# Patient Record
Sex: FEMALE
[De-identification: names, ages, dates, MRNs, and addresses within clinical notes are randomized per-mention and may not be internally consistent; named-entity substitution may affect disease eponyms.]

---

## 2023-10-09 ENCOUNTER — HOSPITAL ENCOUNTER (EMERGENCY)
Dept: HOSPITAL 97 - ER | Age: 18
Discharge: HOME | End: 2023-10-09
Payer: SELF-PAY

## 2023-10-09 VITALS — OXYGEN SATURATION: 100 % | SYSTOLIC BLOOD PRESSURE: 137 MMHG | DIASTOLIC BLOOD PRESSURE: 95 MMHG | TEMPERATURE: 98.1 F

## 2023-10-09 DIAGNOSIS — G51.0: Primary | ICD-10-CM

## 2023-10-09 PROCEDURE — 99283 EMERGENCY DEPT VISIT LOW MDM: CPT

## 2023-10-09 PROCEDURE — 70450 CT HEAD/BRAIN W/O DYE: CPT

## 2023-10-09 NOTE — RAD REPORT
EXAM DESCRIPTION:  CT - Head Brain Wo Cont - 10/9/2023 8:58 pm

 

CLINICAL HISTORY:  BELL'S PALSY

 

COMPARISON:  No comparisons

 

TECHNIQUE:  Noncontrast head CT images were obtained without IV contrast. Multiplanar reformats were 
generated and reviewed.

 

All CT scans are performed using dose optimization technique as appropriate and may include automated
 exposure control or mA/KV adjustment according to patient size.

 

FINDINGS:  No intracranial hemorrhage, mass, or edema. Midline structures are unremarkable.

 

Normal ventricular caliber for age.

 

Gray-white matter differentiation is preserved, without evidence of acute infarct. No abnormal extra-
axial fluid collections.

 

Mastoid air cells are well aerated. Mucous retention cyst in the left maxillary sinus.

 

No acute bony findings.

 

 

IMPRESSION:  No evidence of an acute intracranial process.

## 2023-10-09 NOTE — ER
Nurse's Notes                                                                                     

 [facility]                                                                 

Name: Katia Brynat                                                                               

Age: 18 yrs                                                                                       

Sex: Female                                                                                       

: 2005                                                                                   

MRN: [id number]                                                                                   

Arrival Date: 10/09/2023                                                                          

Time: 20:16                                                                                       

Account#: [id number]                                                                            

Bed IW5                                                                                           

Private MD:                                                                                       

Diagnosis: Bell's palsy                                                                           

                                                                                                  

Presentation:                                                                                     

10/09                                                                                             

20:24 Chief complaint: Patient states: "My tongue has felt funny when I woke up this morning, mb9 

      feels burnt. I have a bad headache and my right side of mouth is drooping. My mother in     

      law noticed my face around 1830 today." Last known normal was 2am. Coronavirus screen:      

      Vaccine status: Patient reports receiving the 2nd dose of the covid vaccine. Ebola          

      Screen: No symptoms or risks identified at this time. Initial Sepsis Screen: Does the       

      patient meet any 2 criteria? No. Patient's initial sepsis screen is negative. Does the      

      patient have a suspected source of infection? No. Patient's initial sepsis screen is        

      negative. Risk Assessment: Do you want to hurt yourself or someone else? Patient            

      reports no desire to harm self or others. Onset of symptoms was 2023.           

20:24 Method Of Arrival: Ambulatory                                                           mb9 

20:28 Acuity: HAI 3                                                                           mb9 

                                                                                                  

Triage Assessment:                                                                                

20:28 The onset of the patients symptoms was. General: Appears in no apparent distress.       mb9 

      Behavior is calm, cooperative. Pain: Complains of pain in head Pain does not radiate.       

      Quality of pain is described as pressure, Is continuous. Neuro: Hernandez                    

      Agitation-Sedation Scale (RASS): 0 - Alert and Calm Level of Consciousness is awake,        

      alert, obeys commands, Oriented to person, place, time, situation, Appropriate for age      

       are equal bilaterally Moves all extremities. Gait is steady, Speech is normal,        

      Facial droop on left, Pupils are PERRLA, Intact Reports headache. Derm: Skin is pink,       

      warm \T\ dry.                                                                               

                                                                                                  

Historical:                                                                                       

- Allergies:                                                                                      

20:28 No Known Allergies;                                                                     mb9 

- Home Meds:                                                                                      

20:28 None [Active];                                                                          mb9 

- PMHx:                                                                                           

20:28 None;                                                                                   mb9 

- PSHx:                                                                                           

20:28 None;                                                                                   mb9 

                                                                                                  

- Immunization history:: Adult Immunizations up to date.                                          

- Social history:: Smoking status: Patient denies any tobacco usage or history of.                

- Family history:: not pertinent.                                                                 

                                                                                                  

                                                                                                  

Screenin:31 [facility] ED Fall Risk Assessment (Adult) History of falling in the last 3 months,       mb9 

      including since admission No falls in past 3 months (0 pts) Confusion or Disorientation     

      No (0 pts) Intoxicated or Sedated No (0 pts) Impaired Gait No (0 pts) Mobility Assist       

      Device Used No (0 pt) Altered Elimination No (0 pt) Score/Fall Risk Level 0 - 2 = Low       

      Risk Oriented to surroundings, Maintained a safe environment, Educated pt \T\ family on     

      fall prevention, incl call for assistance when getting out of bed. Abuse screen: Denies     

      threats or abuse. Nutritional screening: No deficits noted. Tuberculosis screening: No      

      symptoms or risk factors identified.                                                        

                                                                                                  

Assessment:                                                                                       

20:31 Reassessment: see triage assessment.                                                    mb9 

                                                                                                  

Vital Signs:                                                                                      

20:24  / 95; Pulse 87; Resp 18; Temp 98.1; Pulse Ox 100% ; Weight 136.08 kg; Height 5   mb9 

      ft. 4 in. ;                                                                                 

20:24 Body Mass Index 51.49 (136.08 kg, 162.56 cm)                                            mb9 

                                                                                                  

NIH Stroke Scale Scores:                                                                          

20:35 NIHSS Score: 1                                                                          [facility] 

                                                                                                  

ED Course:                                                                                        

20:20 Patient arrived in ED.                                                                  ag3 

20:24 Arm band placed on.                                                                     mb9 

20:27 Alan Chen MD is Attending Physician.                                             [facility] 

20:28 Triage completed.                                                                       mb9 

20:31 Bed in low position. Call light in reach. Side rails up X 1. Client placed on           mb9 

      continuous cardiac and pulse oximetry monitoring. NIBP monitoring applied.                  

20:52 Helder Carter MD is Referral Physician.                                             [facility] 

20:57 CT Head Brain wo Cont In Process Unspecified.                                           EDMS

21:00 No provider procedures requiring assistance completed. Patient did not have IV access   kl  

      during this emergency room visit.                                                           

                                                                                                  

Administered Medications:                                                                         

20:35 Drug: predniSONE PO 60 mg PO once Route: PO;                                            mb9 

20:35 Drug: valACYclovir PO 1000 mg PO once Route: PO;                                        mb9 

                                                                                                  

                                                                                                  

Medication:                                                                                       

21:01 VIS not applicable for this client.                                                     kl  

                                                                                                  

Outcome:                                                                                          

20:52 Discharge ordered by MD.                                                                [facility] 

21:00 Discharged to home ambulatory, with family,                                               

21:00 Condition: stable                                                                           

21:00 Discharge instructions given to patient, Instructed on discharge instructions, follow       

      up and referral plans. medication usage, Demonstrated understanding of instructions,        

      follow-up care, medications, Prescriptions given X 3,                                       

21:01 Patient left the ED.                                                                      

                                                                                                  

                                                                                                  

NIH Stroke Scale - NIH Stroke Score                                                               

Date: 10/09/2023                                                                                  

Time: 20:35                                                                                       

Total Score = 1                                                                                   

  10. Dysarthria (speech clarity - read or repeat words) - 0(Normal)                              

  11. Extinction and Inattention (visual/tactile/auditory/spatial/personal) - 0(No                

      abnormality)                                                                                

  1a. Level of Consciousness (LOC) - 0(Alert)                                                     

  1b. Level of Consciousness (LOC) (Month \T\ Age) - 0(Both)                                      

  1c. LOC Commands (Open \T\ Closes Eyes/) - 0(Both)                                          

   2. Best Gaze (Lateral Gaze Paresis) - 0(Normal)                                                

   3. Visual Field Loss - 0(No visual loss)                                                       

   4. Facial Palsy - 1(Minor Paralysis)                                                           

  5a. Left Arm: Motor (10-second hold) - 0(No drift)                                              

  5b. Right Arm: Motor (10-second hold) - 0(No drift)                                             

  6a. Left Leg: Motor (5-second hold - always test supine) - 0(No drift)                          

  6b. Right Leg: Motor (5-second hold - always test supine) - 0(No drift)                         

   7. Limb Ataxia (finger/nose \T\ heel/shin - test with eyes open) - 0(Absent)                   

   8. Sensory Loss (pinprick arms/legs/face) - 0(Normal)                                          

   9. Best Language: Aphasia (description/naming/reading) - 0(No aphasia)                         

Initials: braxton                                                                                     

                                                                                                  

Signatures:                                                                                       

Dispatcher [employer]                           EDCaitlyn Pollack RN RN kl Anderson, Corey, MD MD cha Gomez, Alice ag3 Breneman, Mary Beth, RN RN   mb9                                                  

                                                                                                  

Corrections: (The following items were deleted from the chart)                                    

20:28 20:24 Chief complaint: Patient states: "My tongue has felt funny when I woke up art         

      this morning, feels burnt. I have a bad headache and my right side of mouth is              

      drooping. My mother in law noticed my face around 1830 today." art                          

                                                                                                  

**************************************************************************************************

## 2023-10-09 NOTE — EDPHYS
Physician Documentation                                                                           

 [facility]                                                                 

Name: Katia Bryant                                                                               

Age: 18 yrs                                                                                       

Sex: Female                                                                                       

: 2005                                                                                   

MRN: [id number]                                                                                   

Arrival Date: 10/09/2023                                                                          

Time: 20:16                                                                                       

Account#: [id number]                                                                            

Bed IW5                                                                                           

Private MD:                                                                                       

ED Physician Alan Chen                                                                      

HPI:                                                                                              

10/09                                                                                             

20:35 This 18 yrs old  Female presents to ER via Ambulatory with complaints of       braxton 

      Facial Droop, Headache, TONGUE FEELS WEIRD.                                                 

20:35 The patient presents to the emergency department with weakness of the right side of the braxton 

      face, that is moderate. Onset: The symptoms/episode began/occurred this morning, today.     

      Context: occurred at an unknown location. Associated signs and symptoms: The patient        

      has no apparent associated signs or symptoms. Severity of symptoms: At their worst the      

      symptoms were moderate in the emergency department the symptoms are unchanged.              

      Patient's baseline: Neuro: alert and fully oriented. Current symptoms: headache, that       

      is mild. The patient has not experienced similar symptoms in the past.                      

                                                                                                  

Historical:                                                                                       

- Allergies:                                                                                      

20:28 No Known Allergies;                                                                     mb9 

- Home Meds:                                                                                      

20:28 None [Active];                                                                          mb9 

- PMHx:                                                                                           

20:28 None;                                                                                   mb9 

- PSHx:                                                                                           

20:28 None;                                                                                   mb9 

                                                                                                  

- Immunization history:: Adult Immunizations up to date.                                          

- Social history:: Smoking status: Patient denies any tobacco usage or history of.                

- Family history:: not pertinent.                                                                 

                                                                                                  

                                                                                                  

ROS:                                                                                              

20:35 Constitutional: Negative for fever, chills, and weight loss, Eyes: Negative for injury, braxton 

      pain, redness, and discharge, ENT: Negative for injury, pain, and discharge, Neck:          

      Negative for injury, pain, and swelling, Cardiovascular: Negative for chest pain,           

      palpitations, and edema, Respiratory: Negative for shortness of breath, cough,              

      wheezing, and pleuritic chest pain, Abdomen/GI: Negative for abdominal pain, nausea,        

      vomiting, diarrhea, and constipation, Back: Negative for injury and pain, : Negative      

      for injury, bleeding, discharge, and swelling, MS/Extremity: Negative for injury and        

      deformity, Skin: Negative for injury, rash, and discoloration, Psych: Negative for          

      depression, anxiety, suicide ideation, homicidal ideation, and hallucinations,              

      Allergy/Immunology: Negative for hives, rash, and allergies, Endocrine: Negative for        

      neck swelling, polydipsia, polyuria, polyphagia, and marked weight changes,                 

      Hematologic/Lymphatic: Negative for swollen nodes, abnormal bleeding, and unusual           

      bruising,                                                                                   

20:35 Neuro: Positive for headache, weakness, of the forehead, right eye, right cheek and         

      right jaw,                                                                                  

                                                                                                  

Exam:                                                                                             

20:35 Constitutional:  This is a well developed, well nourished patient who is awake, alert,  braxton 

      and in no acute distress. Head/Face:  Normocephalic, atraumatic. Eyes:  Pupils equal        

      round and reactive to light, extra-ocular motions intact.  Lids and lashes normal.          

      Conjunctiva and sclera are non-icteric and not injected.  Cornea within normal limits.      

      Periorbital areas with no swelling, redness, or edema. ENT:  Nares patent. No nasal         

      discharge, no septal abnormalities noted.  Tympanic membranes are normal and external       

      auditory canals are clear.  Oropharynx with no redness, swelling, or masses, exudates,      

      or evidence of obstruction, uvula midline.  Mucous membranes moist. Neck:  Trachea          

      midline, no thyromegaly or masses palpated, and no cervical lymphadenopathy.  Supple,       

      full range of motion without nuchal rigidity, or vertebral point tenderness.  No            

      Meningismus. Chest/axilla:  Normal chest wall appearance and motion.  Nontender with no     

      deformity.  No lesions are appreciated. Cardiovascular:  Regular rate and rhythm with a     

      normal S1 and S2.  No gallops, murmurs, or rubs.  Normal PMI, no JVD.  No pulse             

      deficits. Respiratory:  Lungs have equal breath sounds bilaterally, clear to                

      auscultation and percussion.  No rales, rhonchi or wheezes noted.  No increased work of     

      breathing, no retractions or nasal flaring. Abdomen/GI:  Soft, non-tender, with normal      

      bowel sounds.  No distension or tympany.  No guarding or rebound.  No evidence of           

      tenderness throughout. Back:  No spinal tenderness.  No costovertebral tenderness.          

      Full range of motion. Skin:  Warm, dry with normal turgor.  Normal color with no            

      rashes, no lesions, and no evidence of cellulitis. MS/ Extremity:  Pulses equal, no         

      cyanosis.  Neurovascular intact.  Full, normal range of motion. Neuro:  Awake and           

      alert, GCS 15, oriented to person, place, time, and situation.  Cranial nerves II-XII       

      grossly intact.  Motor strength 5/5 in all extremities.  Sensory grossly intact.            

      Cerebellar exam normal.  Normal gait. Psych:  Awake, alert, with orientation to person,     

      place and time.  Behavior, mood, and affect are within normal limits.                       

20:35 Neuro: Orientation: appropriate for stated age, no acute changes, Mentation: is normal,     

      appropriate for stated age, Memory: is normal, Cranial nerves: facial droop noted on        

      right, with forehead involved. Cerebellar function: is grossly normal based on the          

      patient's age, no acute changes, Motor: is normal, is grossly normal based on the           

      patient's age, no acute changes, moves all fours, strength is normal, Sensation: is         

      normal, no obvious gross deficits, appropriate  Gait: is steady, appropriate for age,       

      Babinski testing is normal,                                                                 

                                                                                                  

Vital Signs:                                                                                      

20:24  / 95; Pulse 87; Resp 18; Temp 98.1; Pulse Ox 100% ; Weight 136.08 kg; Height 5   mb9 

      ft. 4 in. ;                                                                                 

20:24 Body Mass Index 51.49 (136.08 kg, 162.56 cm)                                            mb9 

                                                                                                  

NIH Stroke Scale Scores:                                                                          

20:35 NIHSS Score: 1                                                                          braxton 

                                                                                                  

MDM:                                                                                              

20:27 Patient medically screened.                                                             braxton 

20:39 Data reviewed: vital signs, nurses notes, radiologic studies, CT scan. Consideration of braxton 

      Admission/Observation Escalation of care including admission/observation considered. I      

      considered the following discharge prescriptions or medication management in the            

      emergency department Medications were administered in the Emergency Department. See         

      MAR. Test considered but Not performed: Labs: no labs , no ekg. Historians other than       

      the Patient: Parent: mom, well informed. Care significantly affected by the following       

      chronic conditions: Obesity, duane. Counseling: I had a detailed discussion with the         

      patient and/or guardian regarding the historical points, exam findings, and any             

      diagnostic results supporting the discharge/admit diagnosis, the presence of at least       

      one elevated blood pressure reading (>120/80) during this emergency department visit,       

      radiology results, the need for outpatient follow up.                                       

                                                                                                  

10/09                                                                                             

20:35 Order name: CT Head Brain wo Cont                                                       braxton 

                                                                                                  

Administered Medications:                                                                         

20:35 Drug: predniSONE PO 60 mg PO once Route: PO;                                            mb9 

20:35 Drug: valACYclovir PO 1000 mg PO once Route: PO;                                        mb9 

                                                                                                  

                                                                                                  

Disposition Summary:                                                                              

10/09/23 20:52                                                                                    

Discharge Ordered                                                                                 

 Notes:       Location: Home                                                                        
  [place]

      Problem: new                                                                            braxton 

      Symptoms: have improved                                                                 braxton 

      Condition: Stable                                                                       braxton 

      Diagnosis                                                                                   

        - Bell's palsy                                                                        [facility] 

      Followup:                                                                               braxton 

        - With: Private Physician                                                                  

        - When: 2 - 3 days                                                                         

        - Reason: Recheck today's complaints, Continuance of care, Re-evaluation by your           

      physician                                                                                   

      Followup:                                                                               braxton 

        - With: Helder Carter MD                                                               

        - When: 2 - 3 days                                                                         

        - Reason: Recheck today's complaints, Continuance of care, Re-evaluation by your           

      physician                                                                                   

      Discharge Instructions:                                                                     

        - Discharge Summary Sheet                                                             [facility] 

        - Bell's Palsy, Adult                                                                 [facility] 

      Forms:                                                                                      

        - Medication Reconciliation Form                                                      [facility] 

        - Thank You Letter                                                                    [facility] 

        - Antibiotic Education                                                                [facility] 

        - Prescription Opioid Use                                                             [facility] 

        - Patient Portal Instructions                                                         [facility] 

        - Leadership Thank You Letter                                                         [facility] 

      Prescriptions:                                                                              

        - Artificial Tears(glycerin-peg) 1-0.3 % Ophthalmic drops                                  

            - instill 2 drop OPHTHALMIC route every 4 to 6 hours as needed for dry eye(s); 20 [facility] 

      milliliter; Refills: 0, Product Selection Permitted                                         

        - Valtrex 1 gram Oral tablet                                                               

            - take 1 tablet ORAL route 3 times per day; 21 tablet; Refills: 0, Product        braxton 

      Selection Permitted                                                                         

        - Prednisone 20 mg Oral tablet                                                             

            - take 3 tablets ORAL route once daily for 4 days; 12 tablet; Refills: 0, Product braxton 

      Selection Permitted                                                                         

                                                                                                  

NIH Stroke Scale - NIH Stroke Score                                                               

Date: 10/09/2023                                                                                  

Time: 20:35                                                                                       

Total Score = 1                                                                                   

  10. Dysarthria (speech clarity - read or repeat words) - 0(Normal)                              

  11. Extinction and Inattention (visual/tactile/auditory/spatial/personal) - 0(No                

      abnormality)                                                                                

  1a. Level of Consciousness (LOC) - 0(Alert)                                                     

  1b. Level of Consciousness (LOC) (Month \T\ Age) - 0(Both)                                      

  1c. LOC Commands (Open \T\ Closes Eyes/) - 0(Both)                                          

   2. Best Gaze (Lateral Gaze Paresis) - 0(Normal)                                                

   3. Visual Field Loss - 0(No visual loss)                                                       

   4. Facial Palsy - 1(Minor Paralysis)                                                           

  5a. Left Arm: Motor (10-second hold) - 0(No drift)                                              

  5b. Right Arm: Motor (10-second hold) - 0(No drift)                                             

  6a. Left Leg: Motor (5-second hold - always test supine) - 0(No drift)                          

  6b. Right Leg: Motor (5-second hold - always test supine) - 0(No drift)                         

   7. Limb Ataxia (finger/nose \T\ heel/shin - test with eyes open) - 0(Absent)                   

   8. Sensory Loss (pinprick arms/legs/face) - 0(Normal)                                          

   9. Best Language: Aphasia (description/naming/reading) - 0(No aphasia)                         

Initials: braxton                                                                                     

                                                                                                  

Signatures:                                                                                       

Dispatcher [employer]                           Alan Lacy MD MD cha Breneman, Jazmyne Root, RN                 RN   mb9                                                  

                                                                                                  

**************************************************************************************************